# Patient Record
Sex: FEMALE | Race: WHITE
[De-identification: names, ages, dates, MRNs, and addresses within clinical notes are randomized per-mention and may not be internally consistent; named-entity substitution may affect disease eponyms.]

---

## 2018-09-11 ENCOUNTER — HOSPITAL ENCOUNTER (EMERGENCY)
Dept: HOSPITAL 90 - EDH | Age: 33
Discharge: HOME | End: 2018-09-11
Payer: MEDICAID

## 2018-09-11 DIAGNOSIS — Z72.0: ICD-10-CM
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
## 2018-09-11 DIAGNOSIS — Z90.710: ICD-10-CM
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
## 2018-09-11 DIAGNOSIS — Z88.0: ICD-10-CM

## 2018-09-11 DIAGNOSIS — S60.031A: ICD-10-CM
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
## 2018-09-11 DIAGNOSIS — Z90.49: ICD-10-CM

## 2018-09-11 DIAGNOSIS — S60.021A: Primary | ICD-10-CM

## 2018-09-11 DIAGNOSIS — J45.909: ICD-10-CM

## 2018-09-11 DIAGNOSIS — F32.9: ICD-10-CM

## 2018-09-11 DIAGNOSIS — F43.10: ICD-10-CM

## 2018-09-11 PROCEDURE — 96372 THER/PROPH/DIAG INJ SC/IM: CPT

## 2018-09-11 PROCEDURE — 73130 X-RAY EXAM OF HAND: CPT

## 2018-09-11 PROCEDURE — 99284 EMERGENCY DEPT VISIT MOD MDM: CPT

## 2018-09-15 ENCOUNTER — HOSPITAL ENCOUNTER (EMERGENCY)
Dept: HOSPITAL 90 - EDH | Age: 33
Discharge: HOME | End: 2018-09-15
Payer: MEDICAID
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
## 2018-09-15 DIAGNOSIS — Z90.710: ICD-10-CM
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
## 2018-09-15 DIAGNOSIS — Z88.0: ICD-10-CM

## 2018-09-15 DIAGNOSIS — F43.10: ICD-10-CM

## 2018-09-15 DIAGNOSIS — F41.9: ICD-10-CM

## 2018-09-15 DIAGNOSIS — F32.9: ICD-10-CM

## 2018-09-15 DIAGNOSIS — Z90.49: ICD-10-CM

## 2018-09-15 DIAGNOSIS — Z72.0: ICD-10-CM

## 2018-09-15 DIAGNOSIS — S51.811A: Primary | ICD-10-CM

## 2018-09-15 DIAGNOSIS — J45.909: ICD-10-CM

## 2018-09-15 PROCEDURE — 90471 IMMUNIZATION ADMIN: CPT

## 2018-09-15 PROCEDURE — 90714 TD VACC NO PRESV 7 YRS+ IM: CPT

## 2018-09-15 PROCEDURE — 12002 RPR S/N/AX/GEN/TRNK2.6-7.5CM: CPT

## 2018-09-18 ENCOUNTER — HOSPITAL ENCOUNTER (EMERGENCY)
Dept: HOSPITAL 90 - EDH | Age: 33
Discharge: HOME | End: 2018-09-18
Payer: MEDICAID

## 2018-09-18 DIAGNOSIS — J45.909: ICD-10-CM

## 2018-09-18 DIAGNOSIS — F43.10: ICD-10-CM
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
## 2018-09-18 DIAGNOSIS — F41.9: ICD-10-CM

## 2018-09-18 DIAGNOSIS — F32.9: ICD-10-CM

## 2018-09-18 DIAGNOSIS — T81.33XA: Primary | ICD-10-CM

## 2018-09-18 DIAGNOSIS — Z72.0: ICD-10-CM

## 2018-09-18 DIAGNOSIS — Z88.0: ICD-10-CM

## 2018-09-18 PROCEDURE — 73130 X-RAY EXAM OF HAND: CPT

## 2018-09-29 ENCOUNTER — HOSPITAL ENCOUNTER (EMERGENCY)
Dept: HOSPITAL 90 - EDH | Age: 33
Discharge: HOME | End: 2018-09-29
Payer: MEDICAID
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
## 2018-09-29 DIAGNOSIS — F41.9: ICD-10-CM

## 2018-09-29 DIAGNOSIS — Z88.0: ICD-10-CM

## 2018-09-29 DIAGNOSIS — Z72.0: ICD-10-CM

## 2018-09-29 DIAGNOSIS — J45.909: ICD-10-CM

## 2018-09-29 DIAGNOSIS — Z90.710: ICD-10-CM

## 2018-09-29 DIAGNOSIS — F43.10: ICD-10-CM

## 2018-09-29 DIAGNOSIS — F32.9: ICD-10-CM

## 2018-09-29 DIAGNOSIS — S51.811D: Primary | ICD-10-CM

## 2018-09-29 PROCEDURE — 99281 EMR DPT VST MAYX REQ PHY/QHP: CPT

## 2018-12-28 ENCOUNTER — HOSPITAL ENCOUNTER (EMERGENCY)
Dept: HOSPITAL 90 - EDH | Age: 33
Discharge: HOME | End: 2018-12-28
Payer: MEDICAID

## 2018-12-28 DIAGNOSIS — F43.10: ICD-10-CM
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
## 2018-12-28 DIAGNOSIS — F41.9: ICD-10-CM

## 2018-12-28 DIAGNOSIS — J45.909: ICD-10-CM

## 2018-12-28 DIAGNOSIS — Z88.0: ICD-10-CM

## 2018-12-28 DIAGNOSIS — F32.9: ICD-10-CM
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
## 2018-12-28 DIAGNOSIS — S61.412A: Primary | ICD-10-CM

## 2018-12-28 DIAGNOSIS — Z72.0: ICD-10-CM
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
## 2018-12-28 PROCEDURE — 73130 X-RAY EXAM OF HAND: CPT

## 2018-12-28 PROCEDURE — 12001 RPR S/N/AX/GEN/TRNK 2.5CM/<: CPT

## 2019-10-25 ENCOUNTER — HOSPITAL ENCOUNTER (EMERGENCY)
Dept: HOSPITAL 90 - EDH | Age: 34
LOS: 1 days | Discharge: HOME | End: 2019-10-26
Payer: MEDICAID

## 2019-10-25 DIAGNOSIS — F32.9: ICD-10-CM

## 2019-10-25 DIAGNOSIS — J45.909: ICD-10-CM

## 2019-10-25 DIAGNOSIS — Z90.710: ICD-10-CM

## 2019-10-25 DIAGNOSIS — Z72.0: ICD-10-CM

## 2019-10-25 DIAGNOSIS — F43.10: ICD-10-CM

## 2019-10-25 DIAGNOSIS — F41.9: ICD-10-CM

## 2019-10-25 DIAGNOSIS — Z90.49: ICD-10-CM

## 2019-10-25 DIAGNOSIS — M25.562: Primary | ICD-10-CM

## 2019-10-25 PROCEDURE — 99284 EMERGENCY DEPT VISIT MOD MDM: CPT

## 2019-10-25 PROCEDURE — 96372 THER/PROPH/DIAG INJ SC/IM: CPT

## 2019-10-25 PROCEDURE — 29505 APPLICATION LONG LEG SPLINT: CPT

## 2019-10-25 PROCEDURE — 73562 X-RAY EXAM OF KNEE 3: CPT

## 2020-02-29 ENCOUNTER — HOSPITAL ENCOUNTER (INPATIENT)
Dept: HOSPITAL 90 - EDH | Age: 35
LOS: 2 days | Discharge: LEFT BEFORE DISCHARGE | DRG: 101 | End: 2020-03-02
Attending: INTERNAL MEDICINE | Admitting: INTERNAL MEDICINE
Payer: COMMERCIAL

## 2020-02-29 VITALS — HEIGHT: 67 IN | BODY MASS INDEX: 23.1 KG/M2 | WEIGHT: 147.2 LBS

## 2020-02-29 DIAGNOSIS — F17.210: ICD-10-CM

## 2020-02-29 DIAGNOSIS — G40.909: Primary | ICD-10-CM

## 2020-02-29 DIAGNOSIS — Z82.49: ICD-10-CM

## 2020-02-29 DIAGNOSIS — F43.10: ICD-10-CM

## 2020-02-29 DIAGNOSIS — J45.909: ICD-10-CM

## 2020-02-29 DIAGNOSIS — Z82.5: ICD-10-CM

## 2020-02-29 DIAGNOSIS — Z90.710: ICD-10-CM

## 2020-02-29 DIAGNOSIS — Z88.0: ICD-10-CM

## 2020-02-29 DIAGNOSIS — E83.42: ICD-10-CM

## 2020-02-29 DIAGNOSIS — F32.9: ICD-10-CM

## 2020-02-29 LAB
ALBUMIN SERPL-MCNC: 3.8 G/DL (ref 3.5–5)
ALT SERPL-CCNC: 83 U/L (ref 12–78)
AMPHETAMINES UR QL SCN: NEGATIVE
APAP SERPL-MCNC: 12 MCG/ML (ref 10–30)
APTT PPP: 24.9 SEC (ref 26.3–35.5)
AST SERPL-CCNC: 42 U/L (ref 10–37)
BARBITURATES UR QL SCN: NEGATIVE
BASOPHILS NFR BLD AUTO: 0.3 % (ref 0–5)
BENZODIAZ UR QL SCN: POSITIVE
BILIRUB SERPL-MCNC: 0.2 MG/DL (ref 0.2–1)
BUN SERPL-MCNC: 3 MG/DL (ref 7–18)
BZE UR QL SCN: NEGATIVE
CANNABINOIDS UR QL SCN: NEGATIVE
CHLORIDE SERPL-SCNC: 98 MMOL/L (ref 101–111)
CK SERPL-CCNC: 111 U/L (ref 21–232)
CK SERPL-CCNC: 64 U/L (ref 21–232)
CO2 SERPL-SCNC: 23 MMOL/L (ref 21–32)
CREAT SERPL-MCNC: 0.8 MG/DL (ref 0.5–1.5)
EOSINOPHIL NFR BLD AUTO: 0.3 % (ref 0–8)
ERYTHROCYTE [DISTWIDTH] IN BLOOD BY AUTOMATED COUNT: 12.4 % (ref 11–15.5)
ETHANOL SERPL-MCNC: < 3 MG/DL (ref 0–10)
GFR SERPL CREATININE-BSD FRML MDRD: 86 ML/MIN (ref 60–?)
GLUCOSE SERPL-MCNC: 101 MG/DL (ref 70–105)
HCT VFR BLD AUTO: 38.8 % (ref 36–48)
INR PPP: 0.98 (ref 0.85–1.15)
LYMPHOCYTES NFR SPEC AUTO: 8.5 % (ref 21–51)
MCH RBC QN AUTO: 31.6 PG (ref 27–33)
MCHC RBC AUTO-ENTMCNC: 33.5 G/DL (ref 32–36)
MCV RBC AUTO: 94.4 FL (ref 79–99)
MONOCYTES NFR BLD AUTO: 5.1 % (ref 3–13)
MYOGLOBIN SERPL-MCNC: 57 NG/ML (ref 10–92)
NEUTROPHILS NFR BLD AUTO: 85.4 % (ref 40–77)
NRBC BLD MANUAL-RTO: 0 % (ref 0–0.19)
OPIATES UR QL SCN: NEGATIVE
PCP UR QL SCN: NEGATIVE
PH UR STRIP: 5 [PH] (ref 5–8)
PLATELET # BLD AUTO: 208 K/UL (ref 130–400)
POTASSIUM SERPL-SCNC: 3.9 MMOL/L (ref 3.5–5.1)
PROT SERPL-MCNC: 7.4 G/DL (ref 6–8.3)
PROTHROMBIN TIME: 10.3 SEC (ref 9.6–11.6)
RBC # BLD AUTO: 4.11 MIL/UL (ref 4–5.5)
RBC #/AREA URNS HPF: (no result) /HPF (ref 0–1)
SALICYLATES SERPL-MCNC: 5.5 MG/DL (ref 2.8–20)
SODIUM SERPL-SCNC: 132 MMOL/L (ref 136–145)
SP GR UR STRIP: 1.02 (ref 1–1.03)
TROPONIN I SERPL-MCNC: < 0.04 NG/ML (ref 0–0.06)
UROBILINOGEN UR STRIP-MCNC: 0.2 MG/DL (ref 0.2–1)
WBC # BLD AUTO: 11.9 K/UL (ref 4.8–10.8)

## 2020-02-29 PROCEDURE — 70553 MRI BRAIN STEM W/O & W/DYE: CPT

## 2020-02-29 PROCEDURE — 87088 URINE BACTERIA CULTURE: CPT

## 2020-02-29 PROCEDURE — 83874 ASSAY OF MYOGLOBIN: CPT

## 2020-02-29 PROCEDURE — 80048 BASIC METABOLIC PNL TOTAL CA: CPT

## 2020-02-29 PROCEDURE — 85610 PROTHROMBIN TIME: CPT

## 2020-02-29 PROCEDURE — 36415 COLL VENOUS BLD VENIPUNCTURE: CPT

## 2020-02-29 PROCEDURE — 80061 LIPID PANEL: CPT

## 2020-02-29 PROCEDURE — 80305 DRUG TEST PRSMV DIR OPT OBS: CPT

## 2020-02-29 PROCEDURE — 93005 ELECTROCARDIOGRAM TRACING: CPT

## 2020-02-29 PROCEDURE — 82550 ASSAY OF CK (CPK): CPT

## 2020-02-29 PROCEDURE — 81001 URINALYSIS AUTO W/SCOPE: CPT

## 2020-02-29 PROCEDURE — 70450 CT HEAD/BRAIN W/O DYE: CPT

## 2020-02-29 PROCEDURE — 99291 CRITICAL CARE FIRST HOUR: CPT

## 2020-02-29 PROCEDURE — 80053 COMPREHEN METABOLIC PANEL: CPT

## 2020-02-29 PROCEDURE — 85730 THROMBOPLASTIN TIME PARTIAL: CPT

## 2020-02-29 PROCEDURE — 83735 ASSAY OF MAGNESIUM: CPT

## 2020-02-29 PROCEDURE — 85025 COMPLETE CBC W/AUTO DIFF WBC: CPT

## 2020-02-29 PROCEDURE — 84484 ASSAY OF TROPONIN QUANT: CPT

## 2020-03-01 VITALS — DIASTOLIC BLOOD PRESSURE: 70 MMHG | SYSTOLIC BLOOD PRESSURE: 114 MMHG

## 2020-03-01 VITALS — SYSTOLIC BLOOD PRESSURE: 106 MMHG | DIASTOLIC BLOOD PRESSURE: 51 MMHG

## 2020-03-01 VITALS — SYSTOLIC BLOOD PRESSURE: 110 MMHG | DIASTOLIC BLOOD PRESSURE: 67 MMHG

## 2020-03-01 VITALS — DIASTOLIC BLOOD PRESSURE: 69 MMHG | SYSTOLIC BLOOD PRESSURE: 112 MMHG

## 2020-03-01 VITALS — SYSTOLIC BLOOD PRESSURE: 108 MMHG | DIASTOLIC BLOOD PRESSURE: 66 MMHG

## 2020-03-01 LAB
ALBUMIN SERPL-MCNC: 3.4 G/DL (ref 3.5–5)
ALT SERPL-CCNC: 81 U/L (ref 12–78)
AST SERPL-CCNC: 47 U/L (ref 10–37)
BILIRUB SERPL-MCNC: 0.2 MG/DL (ref 0.2–1)
BUN SERPL-MCNC: 3 MG/DL (ref 7–18)
CHLORIDE SERPL-SCNC: 109 MMOL/L (ref 101–111)
CHOLEST SERPL-MCNC: 147 MG/DL (ref ?–200)
CK SERPL-CCNC: 170 U/L (ref 21–232)
CO2 SERPL-SCNC: 25 MMOL/L (ref 21–32)
CREAT SERPL-MCNC: 0.7 MG/DL (ref 0.5–1.5)
ERYTHROCYTE [DISTWIDTH] IN BLOOD BY AUTOMATED COUNT: 12.6 % (ref 11–15.5)
GFR SERPL CREATININE-BSD FRML MDRD: 101 ML/MIN (ref 60–?)
GLUCOSE SERPL-MCNC: 99 MG/DL (ref 70–105)
HCT VFR BLD AUTO: 36.2 % (ref 36–48)
HDLC SERPL-MCNC: 60 MG/DL (ref 35–85)
LDLC SERPL CALC-MCNC: 71 MG/DL (ref 0–99)
LYMPHOCYTES NFR BLD MANUAL: 19 % (ref 22–44)
MAGNESIUM SERPL-MCNC: 2.3 MG/DL (ref 1.8–2.4)
MANUAL DIF COMMENT BLD-IMP: (no result)
MCH RBC QN AUTO: 31 PG (ref 27–33)
MCHC RBC AUTO-ENTMCNC: 32.9 G/DL (ref 32–36)
MCV RBC AUTO: 94.3 FL (ref 79–99)
MONOCYTES NFR BLD MANUAL: 7 % (ref 2–9)
MYOGLOBIN SERPL-MCNC: 36 NG/ML (ref 10–92)
NEUTS SEG NFR BLD MANUAL: 74 % (ref 40–70)
NRBC BLD MANUAL-RTO: 0 % (ref 0–0.19)
PLAT MORPH BLD: ADEQUATE
PLATELET # BLD AUTO: 199 K/UL (ref 130–400)
POTASSIUM SERPL-SCNC: 3.7 MMOL/L (ref 3.5–5.1)
PROT SERPL-MCNC: 6.6 G/DL (ref 6–8.3)
RBC # BLD AUTO: 3.84 MIL/UL (ref 4–5.5)
RBC MORPH BLD: (no result)
SODIUM SERPL-SCNC: 141 MMOL/L (ref 136–145)
TRIGL SERPL-MCNC: 70 MG/DL (ref 30–200)
TROPONIN I SERPL-MCNC: < 0.04 NG/ML (ref 0–0.06)
WBC # BLD AUTO: 9.5 K/UL (ref 4.8–10.8)

## 2020-03-01 RX ADMIN — ENOXAPARIN SODIUM SCH MG: 30 INJECTION SUBCUTANEOUS at 08:39

## 2020-03-01 RX ADMIN — SODIUM CHLORIDE SCH MLS/HR: 0.9 INJECTION, SOLUTION INTRAVENOUS at 06:53

## 2020-03-01 RX ADMIN — SODIUM CHLORIDE SCH MLS/HR: 0.9 INJECTION, SOLUTION INTRAVENOUS at 17:48

## 2020-03-01 RX ADMIN — FAMOTIDINE SCH MG: 20 TABLET, FILM COATED ORAL at 08:37

## 2020-03-01 RX ADMIN — FAMOTIDINE SCH MG: 20 TABLET, FILM COATED ORAL at 21:01

## 2020-03-01 NOTE — NUR
cm note

met with patient and states that pt resides at home with daughter and parents, independent 
with adls and ambulation. no dme. dc plan [place] back home at [place]. provided with [facility] 
[facility], meds assist info. verbalizes understanding.

-------------------------------------------------------------------------------

Addendum: 03/01/20 at 1916 by MARCE KELLER CM

-------------------------------------------------------------------------------

Amended: Links added.

## 2020-03-02 VITALS — SYSTOLIC BLOOD PRESSURE: 114 MMHG | DIASTOLIC BLOOD PRESSURE: 72 MMHG

## 2020-03-02 VITALS — DIASTOLIC BLOOD PRESSURE: 69 MMHG | SYSTOLIC BLOOD PRESSURE: 118 MMHG

## 2020-03-02 VITALS — DIASTOLIC BLOOD PRESSURE: 67 MMHG | SYSTOLIC BLOOD PRESSURE: 107 MMHG

## 2020-03-02 VITALS — DIASTOLIC BLOOD PRESSURE: 66 MMHG | SYSTOLIC BLOOD PRESSURE: 117 MMHG

## 2020-03-02 VITALS — SYSTOLIC BLOOD PRESSURE: 107 MMHG | DIASTOLIC BLOOD PRESSURE: 71 MMHG

## 2020-03-02 VITALS — DIASTOLIC BLOOD PRESSURE: 78 MMHG | SYSTOLIC BLOOD PRESSURE: 108 MMHG

## 2020-03-02 VITALS — SYSTOLIC BLOOD PRESSURE: 121 MMHG | DIASTOLIC BLOOD PRESSURE: 75 MMHG

## 2020-03-02 LAB
BASOPHILS NFR BLD AUTO: 0.6 % (ref 0–5)
BUN SERPL-MCNC: 4 MG/DL (ref 7–18)
CHLORIDE SERPL-SCNC: 106 MMOL/L (ref 101–111)
CO2 SERPL-SCNC: 26 MMOL/L (ref 21–32)
CREAT SERPL-MCNC: 0.6 MG/DL (ref 0.5–1.5)
EOSINOPHIL NFR BLD AUTO: 2 % (ref 0–8)
ERYTHROCYTE [DISTWIDTH] IN BLOOD BY AUTOMATED COUNT: 12.8 % (ref 11–15.5)
GFR SERPL CREATININE-BSD FRML MDRD: 120 ML/MIN (ref 60–?)
GLUCOSE SERPL-MCNC: 88 MG/DL (ref 70–105)
HCT VFR BLD AUTO: 34.7 % (ref 36–48)
LYMPHOCYTES NFR SPEC AUTO: 27.2 % (ref 21–51)
MAGNESIUM SERPL-MCNC: 1.6 MG/DL (ref 1.8–2.4)
MCH RBC QN AUTO: 31.1 PG (ref 27–33)
MCHC RBC AUTO-ENTMCNC: 32.6 G/DL (ref 32–36)
MCV RBC AUTO: 95.6 FL (ref 79–99)
MONOCYTES NFR BLD AUTO: 9.6 % (ref 3–13)
NEUTROPHILS NFR BLD AUTO: 60.3 % (ref 40–77)
NRBC BLD MANUAL-RTO: 0 % (ref 0–0.19)
PLATELET # BLD AUTO: 170 K/UL (ref 130–400)
POTASSIUM SERPL-SCNC: 3.6 MMOL/L (ref 3.5–5.1)
RBC # BLD AUTO: 3.63 MIL/UL (ref 4–5.5)
SODIUM SERPL-SCNC: 140 MMOL/L (ref 136–145)
WBC # BLD AUTO: 6.4 K/UL (ref 4.8–10.8)

## 2020-03-02 RX ADMIN — FAMOTIDINE SCH MG: 20 TABLET, FILM COATED ORAL at 13:08

## 2020-03-02 RX ADMIN — SODIUM CHLORIDE SCH MLS/HR: 0.9 INJECTION, SOLUTION INTRAVENOUS at 06:27

## 2020-03-02 RX ADMIN — ENOXAPARIN SODIUM SCH MG: 30 INJECTION SUBCUTANEOUS at 09:00

## 2020-03-02 NOTE — NUR
AGAINST MEDICAL ADVICE



PATIENT INFORMED THE HOSPITALIST IS ON THE WAY. PATIENT STATES "I CANNOT WAIT ANY LONGER. I 
HAVE TO LEAVE, MY MOM IS ALREADY WAITING FOR ME DOWNSTAIRS." PATIENT REFUSED TO SIGN AGAINST 
MEDICAL ADVICE FORM. PATIENT HAS NO IV IN PLACE.

## 2020-03-02 NOTE — NUR
HOSPITALIST PAGED



PATIENT IS ADAMANT SHE WANT TO BE DISCHARGED TODAY AND WANTS HOSPITALIST TO SEE HER SOON. 
DIRECTOR SCARLET NOTIFIED WHO TOLD ME HE CONTACTED HOSPITALIST.

## 2020-06-21 ENCOUNTER — HOSPITAL ENCOUNTER (EMERGENCY)
Dept: HOSPITAL 90 - EDH | Age: 35
Discharge: ANOTHER HOSPITAL | End: 2020-06-21
Payer: MEDICAID

## 2020-06-21 DIAGNOSIS — Z90.49: ICD-10-CM

## 2020-06-21 DIAGNOSIS — Z88.0: ICD-10-CM

## 2020-06-21 DIAGNOSIS — F32.9: ICD-10-CM

## 2020-06-21 DIAGNOSIS — R45.850: ICD-10-CM

## 2020-06-21 DIAGNOSIS — Z72.0: ICD-10-CM

## 2020-06-21 DIAGNOSIS — J45.909: ICD-10-CM

## 2020-06-21 DIAGNOSIS — Z90.710: ICD-10-CM

## 2020-06-21 DIAGNOSIS — F41.9: ICD-10-CM

## 2020-06-21 DIAGNOSIS — R45.851: Primary | ICD-10-CM

## 2020-06-21 LAB
ALBUMIN SERPL-MCNC: 3.2 G/DL (ref 3.5–5)
ALT SERPL-CCNC: 18 U/L (ref 12–78)
AMPHETAMINES UR QL SCN: NEGATIVE
APAP SERPL-MCNC: 10 MCG/ML (ref 10–30)
AST SERPL-CCNC: 15 U/L (ref 10–37)
BARBITURATES UR QL SCN: NEGATIVE
BASOPHILS NFR BLD AUTO: 0.4 % (ref 0–5)
BENZODIAZ UR QL SCN: POSITIVE
BILIRUB SERPL-MCNC: 0.1 MG/DL (ref 0.2–1)
BUN SERPL-MCNC: 13 MG/DL (ref 7–18)
BZE UR QL SCN: POSITIVE
CANNABINOIDS UR QL SCN: NEGATIVE
CHLORIDE SERPL-SCNC: 105 MMOL/L (ref 101–111)
CO2 SERPL-SCNC: 31 MMOL/L (ref 21–32)
CREAT SERPL-MCNC: 0.9 MG/DL (ref 0.5–1.5)
EOSINOPHIL NFR BLD AUTO: 2 % (ref 0–8)
ERYTHROCYTE [DISTWIDTH] IN BLOOD BY AUTOMATED COUNT: 12.3 % (ref 11–15.5)
ETHANOL SERPL-MCNC: 152 MG/DL (ref 0–10)
GFR SERPL CREATININE-BSD FRML MDRD: 75 ML/MIN (ref 60–?)
GLUCOSE SERPL-MCNC: 117 MG/DL (ref 70–105)
HCG UR QL: NEGATIVE
HCT VFR BLD AUTO: 39.4 % (ref 36–48)
LYMPHOCYTES NFR SPEC AUTO: 36.4 % (ref 21–51)
MCH RBC QN AUTO: 33 PG (ref 27–33)
MCHC RBC AUTO-ENTMCNC: 34 G/DL (ref 32–36)
MCV RBC AUTO: 97 FL (ref 79–99)
MONOCYTES NFR BLD AUTO: 7.3 % (ref 3–13)
NEUTROPHILS NFR BLD AUTO: 53.7 % (ref 40–77)
NRBC BLD MANUAL-RTO: 0 % (ref 0–0.19)
OPIATES UR QL SCN: NEGATIVE
PCP UR QL SCN: NEGATIVE
PH UR STRIP: 5 [PH] (ref 5–8)
PLATELET # BLD AUTO: 235 K/UL (ref 130–400)
POTASSIUM SERPL-SCNC: 3.7 MMOL/L (ref 3.5–5.1)
PROT SERPL-MCNC: 6.3 G/DL (ref 6–8.3)
RBC # BLD AUTO: 4.06 MIL/UL (ref 4–5.5)
RBC #/AREA URNS HPF: (no result) /HPF (ref 0–1)
SALICYLATES SERPL-MCNC: 4 MG/DL (ref 2.8–20)
SODIUM SERPL-SCNC: 141 MMOL/L (ref 136–145)
SP GR UR STRIP: 1.01 (ref 1–1.03)
UROBILINOGEN UR STRIP-MCNC: 0.2 MG/DL (ref 0.2–1)
WBC # BLD AUTO: 9.2 K/UL (ref 4.8–10.8)
WBC #/AREA URNS HPF: (no result) /HPF (ref 0–1)

## 2020-06-21 PROCEDURE — 81025 URINE PREGNANCY TEST: CPT

## 2020-06-21 PROCEDURE — 99285 EMERGENCY DEPT VISIT HI MDM: CPT

## 2020-06-21 PROCEDURE — 81001 URINALYSIS AUTO W/SCOPE: CPT

## 2020-06-21 PROCEDURE — 80305 DRUG TEST PRSMV DIR OPT OBS: CPT

## 2020-06-21 PROCEDURE — 36415 COLL VENOUS BLD VENIPUNCTURE: CPT

## 2020-06-21 PROCEDURE — 80053 COMPREHEN METABOLIC PANEL: CPT

## 2020-06-21 PROCEDURE — 85025 COMPLETE CBC W/AUTO DIFF WBC: CPT
